# Patient Record
Sex: FEMALE | Race: WHITE | NOT HISPANIC OR LATINO | Employment: FULL TIME | URBAN - NONMETROPOLITAN AREA
[De-identification: names, ages, dates, MRNs, and addresses within clinical notes are randomized per-mention and may not be internally consistent; named-entity substitution may affect disease eponyms.]

---

## 2022-05-26 ENCOUNTER — OFFICE VISIT (OUTPATIENT)
Dept: CARDIOLOGY | Facility: CLINIC | Age: 67
End: 2022-05-26

## 2022-05-26 VITALS
SYSTOLIC BLOOD PRESSURE: 98 MMHG | HEART RATE: 71 BPM | WEIGHT: 160 LBS | HEIGHT: 67 IN | DIASTOLIC BLOOD PRESSURE: 60 MMHG | BODY MASS INDEX: 25.11 KG/M2 | OXYGEN SATURATION: 100 %

## 2022-05-26 DIAGNOSIS — I10 PRIMARY HYPERTENSION: ICD-10-CM

## 2022-05-26 DIAGNOSIS — R07.9 CHEST PAIN, UNSPECIFIED TYPE: Primary | ICD-10-CM

## 2022-05-26 PROCEDURE — 99203 OFFICE O/P NEW LOW 30 MIN: CPT | Performed by: INTERNAL MEDICINE

## 2022-05-26 RX ORDER — OXYBUTYNIN CHLORIDE 5 MG/1
TABLET ORAL
COMMUNITY

## 2022-05-26 RX ORDER — ACYCLOVIR 800 MG/1
TABLET ORAL
COMMUNITY

## 2022-05-26 RX ORDER — SERTRALINE HYDROCHLORIDE 100 MG/1
TABLET, FILM COATED ORAL
COMMUNITY

## 2022-05-26 RX ORDER — ALPRAZOLAM 0.25 MG/1
TABLET ORAL
COMMUNITY

## 2022-05-26 RX ORDER — LISINOPRIL 10 MG/1
TABLET ORAL
COMMUNITY

## 2022-05-26 NOTE — PROGRESS NOTES
"     [facility] Cardiology OP Consult Note    Qing Armendariz  [id number]  2022    Referred By: Caden Potts MD    Chief Complaint: Chest pain    History of Present Illness:   Mrs. Qing Armendariz is a 67 y.o. female who presents to the Cardiology Clinic for evaluation of chest pain.  The patient has a past medical history significant for hypertension.  She does not have any significant past cardiac history.  She presents to the cardiology clinic today for evaluation of chest discomfort.  The patient reports several recent episodes of chest discomfort.  She reports the initial episode occurred while laying down in bed.  She subsequent developed a \"sharp\" discomfort located in her central chest.  She denies any associated nausea, vomiting, or diaphoresis.  She does report mild exertional dyspnea associated with her chest discomfort.  She presented to an outside emergency department where she was briefly placed on a nitro drip.  At that time, cardiac enzymes remained within normal limits with no evidence of ACS.  She was subsequently discharged home.  Since that time, she has had several \"brief\" episodes of chest discomfort which have again been sharp in character.  She continues to have occasional exertional dyspnea.  No other specific complaints.    Past Cardiac Testin. Last Coronary Angio: None  2. Prior Stress Testing: Remote, reportedly unremarkable  3. Last Echo:    1.  Normal LV systolic function, LVEF 55-60%   2.  Mild MR and AI  4. Prior Holter Monitor: None    Review of Systems:   Review of Systems   Constitutional: Negative for activity change, appetite change, chills, diaphoresis, fatigue, fever, unexpected weight gain and unexpected weight loss.   Eyes: Negative for blurred vision and double vision.   Respiratory: Positive for shortness of breath. Negative for cough, chest tightness and wheezing.    Cardiovascular: Positive for chest pain. Negative for palpitations and " leg swelling.   Gastrointestinal: Negative for abdominal pain, anal bleeding, blood in stool and GERD.   Endocrine: Negative for cold intolerance and heat intolerance.   Genitourinary: Negative for hematuria.   Neurological: Negative for dizziness, syncope, weakness and light-headedness.   Hematological: Does not bruise/bleed easily.   Psychiatric/Behavioral: Negative for depressed mood and stress. The patient is not nervous/anxious.        Past Medical History: History reviewed. No pertinent past medical history.    Past Surgical History:   Past Surgical History:   Procedure Laterality Date   • APPENDECTOMY     • CHOLECYSTECTOMY     • HYSTERECTOMY  1990    FULL       Family History:   Family History   Problem Relation Age of Onset   • Heart attack Mother    • COPD Mother    • Heart failure Mother    • Heart failure Sister        Social History:   Social History     Socioeconomic History   • Marital status:        Medications:     Current Outpatient Medications:   •  acyclovir (ZOVIRAX) 800 MG tablet, acyclovir 800 mg tablet  TAKE 1 TABLET BY MOUTH THREE TIMES DAILY, Disp: , Rfl:   •  ALPRAZolam (XANAX) 0.25 MG tablet, alprazolam 0.25 mg tablet  TAKE 1 TABLET BY MOUTH THREE TIMES DAILY AS NEEDED FOR ANXIETY, Disp: , Rfl:   •  lisinopril (PRINIVIL,ZESTRIL) 10 MG tablet, lisinopril 10 mg tablet  TAKE 1 TABLET BY MOUTH ONCE DAILY, Disp: , Rfl:   •  oxybutynin (DITROPAN) 5 MG tablet, oxybutynin chloride 5 mg tablet  TAKE 1 TABLET BY MOUTH TWICE DAILY, Disp: , Rfl:   •  Oxybutynin Chloride (DITROPAN PO), Take  by mouth Daily., Disp: , Rfl:   •  sertraline (ZOLOFT) 100 MG tablet, sertraline 100 mg tablet  TAKE 1 TABLET BY MOUTH ONCE DAILY, Disp: , Rfl:     Allergies:   No Known Allergies    Physical Exam:  Vital Signs:   Vitals:    05/26/22 1251 05/26/22 1300   BP: 100/68 98/60   BP Location: Right arm Left arm   Patient Position: Sitting Sitting   Pulse: 71    SpO2: 100%    Weight: 72.6 kg (160 lb)    Height:  "170.2 cm (67\")        Physical Exam  Constitutional:       General: She is not in acute distress.     Appearance: Normal appearance. She is well-developed. She is not diaphoretic.   HENT:      Head: Normocephalic and atraumatic.   Eyes:      General: No scleral icterus.     Pupils: Pupils are equal, round, and reactive to light.   Neck:      Trachea: No tracheal deviation.   Cardiovascular:      Rate and Rhythm: Normal rate and regular rhythm.      Heart sounds: Normal heart sounds. No murmur heard.    No friction rub. No gallop.      Comments: Normal JVD.  Pulmonary:      Effort: Pulmonary effort is normal. No respiratory distress.      Breath sounds: Normal breath sounds. No stridor. No wheezing or rales.   Chest:      Chest wall: No tenderness.   Abdominal:      General: Bowel sounds are normal. There is no distension.      Palpations: Abdomen is soft.      Tenderness: There is no abdominal tenderness. There is no guarding or rebound.   Musculoskeletal:         General: No swelling. Normal range of motion.      Cervical back: Neck supple. No tenderness.   Lymphadenopathy:      Cervical: No cervical adenopathy.   Skin:     General: Skin is warm and dry.      Findings: No erythema.   Neurological:      General: No focal deficit present.      Mental Status: She is alert and oriented to person, place, and time.   Psychiatric:         Mood and Affect: Mood normal.         Behavior: Behavior normal.         Results Review:   I reviewed the patient's new clinical results.    ECG 4/23/2022: Sinus rhythm.  Normal axis.  No acute ischemic changes.    Assessment / Plan:     1. Chest pain  -- Presents today for evaluation of chest pain, with both typical and atypical features  -- ECG on 4/23 without evidence of acute or prior ischemia  -- Recent evaluation in the emergency department for chest pain was unremarkable with no evidence of ACS at that time  -- Echocardiogram completed, with normal LV systolic function without " regional wall motion abnormalities  -- Will proceed with pharmacologic MPS (inability to exercise due to back pain) to further evaluate for ischemia contributing current symptoms  -- If MPS unremarkable, would then consider PFTs for evaluation of exertional dyspnea  -- Follow-up in 1 month, sooner if required    2. Primary hypertension  -- BP adequately controlled with current antihypertensive        Follow Up:   Return in about 4 weeks (around 6/23/2022).      Thank you for allowing me to participate in the care of your patient. Please to not hesitate to contact me with additional questions or concerns.     RIZWAN Watson MD  Interventional Cardiology   05/26/2022  12:57 EDT

## 2022-06-02 ENCOUNTER — HOSPITAL ENCOUNTER (OUTPATIENT)
Dept: NUCLEAR MEDICINE | Facility: HOSPITAL | Age: 67
Discharge: HOME | End: 2022-06-02

## 2022-06-02 DIAGNOSIS — R07.9 CHEST PAIN, UNSPECIFIED TYPE: ICD-10-CM

## 2022-06-02 PROCEDURE — 0 TECHNETIUM SESTAMIBI: Performed by: INTERNAL MEDICINE

## 2022-06-02 PROCEDURE — 78452 HT MUSCLE IMAGE SPECT MULT: CPT

## 2022-06-02 PROCEDURE — 93017 CV STRESS TEST TRACING ONLY: CPT

## 2022-06-02 PROCEDURE — 25010000002 REGADENOSON 0.4 MG/5ML SOLUTION: Performed by: INTERNAL MEDICINE

## 2022-06-02 PROCEDURE — 93018 CV STRESS TEST I&R ONLY: CPT | Performed by: INTERNAL MEDICINE

## 2022-06-02 PROCEDURE — 78452 HT MUSCLE IMAGE SPECT MULT: CPT | Performed by: INTERNAL MEDICINE

## 2022-06-02 PROCEDURE — A9500 TC99M SESTAMIBI: HCPCS | Performed by: INTERNAL MEDICINE

## 2022-06-02 RX ADMIN — TECHNETIUM TC 99M SESTAMIBI 1 DOSE: 1 INJECTION INTRAVENOUS at 06:30

## 2022-06-02 RX ADMIN — REGADENOSON 0.4 MG: 0.08 INJECTION, SOLUTION INTRAVENOUS at 08:46

## 2022-06-02 RX ADMIN — TECHNETIUM TC 99M SESTAMIBI 1 DOSE: 1 INJECTION INTRAVENOUS at 08:46

## 2022-06-03 LAB
BH CV REST NUCLEAR ISOTOPE DOSE: 10 MCI
BH CV STRESS COMMENTS STAGE 1: NORMAL
BH CV STRESS DOSE REGADENOSON STAGE 1: 0.4
BH CV STRESS DURATION MIN STAGE 1: 0
BH CV STRESS DURATION SEC STAGE 1: 10
BH CV STRESS NUCLEAR ISOTOPE DOSE: 34.2 MCI
BH CV STRESS PROTOCOL 1: NORMAL
BH CV STRESS RECOVERY BP: NORMAL MMHG
BH CV STRESS RECOVERY HR: 65 BPM
BH CV STRESS STAGE 1: 1
LV EF NUC BP: 63 %
MAXIMAL PREDICTED HEART RATE: 153 BPM
PERCENT MAX PREDICTED HR: 50.98 %
STRESS BASELINE BP: NORMAL MMHG
STRESS BASELINE HR: 48 BPM
STRESS PERCENT HR: 60 %
STRESS POST PEAK BP: NORMAL MMHG
STRESS POST PEAK HR: 78 BPM
STRESS TARGET HR: 130 BPM

## 2022-07-21 ENCOUNTER — OFFICE VISIT (OUTPATIENT)
Dept: CARDIOLOGY | Facility: CLINIC | Age: 67
End: 2022-07-21

## 2022-07-21 VITALS
HEART RATE: 67 BPM | OXYGEN SATURATION: 96 % | DIASTOLIC BLOOD PRESSURE: 70 MMHG | WEIGHT: 161 LBS | BODY MASS INDEX: 25.27 KG/M2 | SYSTOLIC BLOOD PRESSURE: 110 MMHG | HEIGHT: 67 IN

## 2022-07-21 DIAGNOSIS — I10 PRIMARY HYPERTENSION: ICD-10-CM

## 2022-07-21 DIAGNOSIS — R07.9 CHEST PAIN, UNSPECIFIED TYPE: Primary | ICD-10-CM

## 2022-07-21 PROCEDURE — 99213 OFFICE O/P EST LOW 20 MIN: CPT | Performed by: INTERNAL MEDICINE

## 2022-07-21 NOTE — PROGRESS NOTES
"             [facility] Cardiology Office Follow Up Note    Qing Armendariz  [id number]  2022    Primary Care Provider: Caden Potts MD    Chief Complaint: Follow-up after cardiac testing    History of Present Illness:   Mrs. Qing Armendariz is a 67 y.o. female who presents to the Cardiology Clinic for follow-up after cardiac testing. The patient has a past medical history significant for hypertension.  She does not have any significant past cardiac history.  She initially presented to the cardiology clinic for evaluation of atypical chest pain.  She subsequently went a pharmacologic MPS in , with no evidence of inducible ischemia and normal LV systolic function.  She presents today for follow-up.  Since her last appointment, the patient reports he has generally been well.  She is dealing with back pain, and planning to have spinal surgery next month.  She reports rare episodes of chest pain described as a \"heaviness\" in her central chest.  She is only had 2 episodes since her last appointment.  The episodes occur randomly, no clear aggravating or alleviating factors.  She does report she is able to ambulate and exert herself without exertional chest pain or significant exertional dyspnea.  She wonders if her chest discomfort may be related to underlying stress.  No other specific complaints today.    Past Cardiac Testin. Last Coronary Angio: None  2. Prior Stress Testing: MPS    1. Normal pharmacologic MPS with no evidence of inducible ischemia.    2. Normal LV systolic function, LVEF 64%.  3. Last Echo:               1.  Normal LV systolic function, LVEF 55-60%              2.  Mild MR and AI  4. Prior Holter Monitor: None    Review of Systems:   Review of Systems   Constitutional: Negative for activity change, appetite change, chills, diaphoresis, fatigue, fever, unexpected weight gain and unexpected weight loss.   Eyes: Negative for blurred vision and double vision. " "  Respiratory: Positive for chest tightness. Negative for cough, shortness of breath and wheezing.    Cardiovascular: Negative for chest pain, palpitations and leg swelling.   Gastrointestinal: Negative for abdominal pain, anal bleeding, blood in stool and GERD.   Endocrine: Negative for cold intolerance and heat intolerance.   Genitourinary: Negative for hematuria.   Musculoskeletal: Positive for back pain.   Neurological: Negative for dizziness, syncope, weakness and light-headedness.   Hematological: Does not bruise/bleed easily.   Psychiatric/Behavioral: Negative for depressed mood and stress. The patient is not nervous/anxious.        I have reviewed and/or updated the patient's past medical, past surgical, family, social history, problem list and allergies as appropriate.     Medications:     Current Outpatient Medications:   •  acyclovir (ZOVIRAX) 800 MG tablet, acyclovir 800 mg tablet  TAKE 1 TABLET BY MOUTH THREE TIMES DAILY, Disp: , Rfl:   •  ALPRAZolam (XANAX) 0.25 MG tablet, alprazolam 0.25 mg tablet  TAKE 1 TABLET BY MOUTH THREE TIMES DAILY AS NEEDED FOR ANXIETY, Disp: , Rfl:   •  lisinopril (PRINIVIL,ZESTRIL) 10 MG tablet, lisinopril 10 mg tablet  TAKE 1 TABLET BY MOUTH ONCE DAILY, Disp: , Rfl:   •  oxybutynin (DITROPAN) 5 MG tablet, oxybutynin chloride 5 mg tablet  TAKE 1 TABLET BY MOUTH TWICE DAILY, Disp: , Rfl:   •  sertraline (ZOLOFT) 100 MG tablet, sertraline 100 mg tablet  TAKE 1 TABLET BY MOUTH ONCE DAILY, Disp: , Rfl:   •  Oxybutynin Chloride (DITROPAN PO), Take  by mouth Daily., Disp: , Rfl:     Physical Exam:  Vital Signs:   Vitals:    07/21/22 1415   BP: 110/70   BP Location: Left arm   Patient Position: Sitting   Cuff Size: Adult   Pulse: 67   SpO2: 96%   Weight: 73 kg (161 lb)   Height: 170.2 cm (67\")       Physical Exam  Constitutional:       General: She is not in acute distress.     Appearance: Normal appearance. She is well-developed. She is not diaphoretic.   HENT:      Head: " Normocephalic and atraumatic.   Eyes:      General: No scleral icterus.     Pupils: Pupils are equal, round, and reactive to light.   Neck:      Trachea: No tracheal deviation.   Cardiovascular:      Rate and Rhythm: Normal rate and regular rhythm.      Heart sounds: Normal heart sounds. No murmur heard.    No friction rub. No gallop.      Comments: Normal JVD.  Pulmonary:      Effort: Pulmonary effort is normal. No respiratory distress.      Breath sounds: Normal breath sounds. No stridor. No wheezing or rales.   Chest:      Chest wall: No tenderness.   Abdominal:      General: Bowel sounds are normal. There is no distension.      Palpations: Abdomen is soft.      Tenderness: There is no abdominal tenderness. There is no guarding or rebound.   Musculoskeletal:         General: No swelling. Normal range of motion.      Cervical back: Neck supple. No tenderness.      Comments: Ambulating with a cane   Lymphadenopathy:      Cervical: No cervical adenopathy.   Skin:     General: Skin is warm and dry.      Findings: No erythema.   Neurological:      General: No focal deficit present.      Mental Status: She is alert and oriented to person, place, and time.   Psychiatric:         Mood and Affect: Mood normal.         Behavior: Behavior normal.         Results Review:   I reviewed the patient's new clinical results.      Assessment / Plan:     1. Chest pain  --Episodes of chest pain, atypical in character and no association with exertion  --Prior ECG without evidence of acute or prior ischemia  --Echocardiogram in 4/22 with normal LV systolic function and no regional wall motion abnormalities  --MPS completed, no evidence of inducible ischemia  --Given chest pain is atypical and unremarkable MPS, low suspicion for ischemia contributing current symptoms  --No further cardiac work-up required at this time  --Will follow on a as needed basis     2. Primary hypertension  --BP remains well controlled with  lisinopril       Follow Up:   Return if symptoms worsen or fail to improve.      Thank you for allowing me to participate in the care of your patient. Please to not hesitate to contact me with additional questions or concerns.     RIZWAN Watson MD  Interventional Cardiology   07/21/2022  14:20 EDT

## 2022-07-26 ENCOUNTER — TELEPHONE (OUTPATIENT)
Dept: CARDIOLOGY | Facility: CLINIC | Age: 67
End: 2022-07-26

## 2022-07-26 NOTE — TELEPHONE ENCOUNTER
Caller: LARY Spaulding call back number: [phone]    What form or medical record are you requesting: MOST RECENT STRESS TEST, AND OFFICE NOTE.    Who is requesting this form or medical record from you: [facility]    How would you like to receive the form or medical records (pick-up, mail, fax): FAX  If fax, what is the fax number: [phone]